# Patient Record
Sex: MALE | Race: WHITE | NOT HISPANIC OR LATINO | URBAN - METROPOLITAN AREA
[De-identification: names, ages, dates, MRNs, and addresses within clinical notes are randomized per-mention and may not be internally consistent; named-entity substitution may affect disease eponyms.]

---

## 2017-11-09 ENCOUNTER — INPATIENT (INPATIENT)
Facility: HOSPITAL | Age: 52
LOS: 0 days | Discharge: HOME | DRG: 419 | End: 2017-11-10
Attending: SPECIALIST | Admitting: SPECIALIST
Payer: COMMERCIAL

## 2017-11-09 ENCOUNTER — RESULT REVIEW (OUTPATIENT)
Age: 52
End: 2017-11-09

## 2017-11-09 ENCOUNTER — TRANSCRIPTION ENCOUNTER (OUTPATIENT)
Age: 52
End: 2017-11-09

## 2017-11-09 VITALS
RESPIRATION RATE: 18 BRPM | TEMPERATURE: 98 F | DIASTOLIC BLOOD PRESSURE: 81 MMHG | WEIGHT: 225.09 LBS | SYSTOLIC BLOOD PRESSURE: 162 MMHG | OXYGEN SATURATION: 97 % | HEART RATE: 80 BPM

## 2017-11-09 DIAGNOSIS — K81.9 CHOLECYSTITIS, UNSPECIFIED: ICD-10-CM

## 2017-11-09 DIAGNOSIS — Z98.890 OTHER SPECIFIED POSTPROCEDURAL STATES: Chronic | ICD-10-CM

## 2017-11-09 LAB
ABO RH CONFIRMATION: SIGNIFICANT CHANGE UP
ALBUMIN SERPL ELPH-MCNC: 3.8 G/DL — SIGNIFICANT CHANGE UP (ref 3.5–5)
ALP SERPL-CCNC: 58 U/L — SIGNIFICANT CHANGE UP (ref 40–120)
ALT FLD-CCNC: 50 U/L DA — SIGNIFICANT CHANGE UP (ref 10–60)
ANION GAP SERPL CALC-SCNC: 5 MMOL/L — SIGNIFICANT CHANGE UP (ref 5–17)
APPEARANCE UR: CLEAR — SIGNIFICANT CHANGE UP
APTT BLD: 34 SEC — SIGNIFICANT CHANGE UP (ref 27.5–37.4)
AST SERPL-CCNC: 20 U/L — SIGNIFICANT CHANGE UP (ref 10–40)
BASOPHILS # BLD AUTO: 0 K/UL — SIGNIFICANT CHANGE UP (ref 0–0.2)
BASOPHILS NFR BLD AUTO: 0.2 % — SIGNIFICANT CHANGE UP (ref 0–2)
BILIRUB SERPL-MCNC: 0.5 MG/DL — SIGNIFICANT CHANGE UP (ref 0.2–1.2)
BILIRUB UR-MCNC: NEGATIVE — SIGNIFICANT CHANGE UP
BUN SERPL-MCNC: 19 MG/DL — HIGH (ref 7–18)
CALCIUM SERPL-MCNC: 9 MG/DL — SIGNIFICANT CHANGE UP (ref 8.4–10.5)
CHLORIDE SERPL-SCNC: 100 MMOL/L — SIGNIFICANT CHANGE UP (ref 96–108)
CO2 SERPL-SCNC: 30 MMOL/L — SIGNIFICANT CHANGE UP (ref 22–31)
COLOR SPEC: YELLOW — SIGNIFICANT CHANGE UP
CREAT SERPL-MCNC: 1.13 MG/DL — SIGNIFICANT CHANGE UP (ref 0.5–1.3)
DIFF PNL FLD: ABNORMAL
EOSINOPHIL # BLD AUTO: 0 K/UL — SIGNIFICANT CHANGE UP (ref 0–0.5)
EOSINOPHIL NFR BLD AUTO: 0.3 % — SIGNIFICANT CHANGE UP (ref 0–6)
GLUCOSE SERPL-MCNC: 137 MG/DL — HIGH (ref 70–99)
GLUCOSE UR QL: NEGATIVE — SIGNIFICANT CHANGE UP
HCT VFR BLD CALC: 50.9 % — HIGH (ref 39–50)
HGB BLD-MCNC: 16.2 G/DL — SIGNIFICANT CHANGE UP (ref 13–17)
INR BLD: 1.02 RATIO — SIGNIFICANT CHANGE UP (ref 0.88–1.16)
KETONES UR-MCNC: NEGATIVE — SIGNIFICANT CHANGE UP
LEUKOCYTE ESTERASE UR-ACNC: NEGATIVE — SIGNIFICANT CHANGE UP
LIDOCAIN IGE QN: 166 U/L — SIGNIFICANT CHANGE UP (ref 73–393)
LYMPHOCYTES # BLD AUTO: 1.4 K/UL — SIGNIFICANT CHANGE UP (ref 1–3.3)
LYMPHOCYTES # BLD AUTO: 10.5 % — LOW (ref 13–44)
MCHC RBC-ENTMCNC: 26.4 PG — LOW (ref 27–34)
MCHC RBC-ENTMCNC: 31.9 GM/DL — LOW (ref 32–36)
MCV RBC AUTO: 82.6 FL — SIGNIFICANT CHANGE UP (ref 80–100)
MONOCYTES # BLD AUTO: 0.4 K/UL — SIGNIFICANT CHANGE UP (ref 0–0.9)
MONOCYTES NFR BLD AUTO: 2.8 % — SIGNIFICANT CHANGE UP (ref 2–14)
NEUTROPHILS # BLD AUTO: 11.6 K/UL — HIGH (ref 1.8–7.4)
NEUTROPHILS NFR BLD AUTO: 86.1 % — HIGH (ref 43–77)
NITRITE UR-MCNC: NEGATIVE — SIGNIFICANT CHANGE UP
PH UR: 8 — SIGNIFICANT CHANGE UP (ref 5–8)
PLATELET # BLD AUTO: 246 K/UL — SIGNIFICANT CHANGE UP (ref 150–400)
POTASSIUM SERPL-MCNC: 4.6 MMOL/L — SIGNIFICANT CHANGE UP (ref 3.5–5.3)
POTASSIUM SERPL-SCNC: 4.6 MMOL/L — SIGNIFICANT CHANGE UP (ref 3.5–5.3)
PROT SERPL-MCNC: 8 G/DL — SIGNIFICANT CHANGE UP (ref 6–8.3)
PROT UR-MCNC: 15
PROTHROM AB SERPL-ACNC: 11.1 SEC — SIGNIFICANT CHANGE UP (ref 9.8–12.7)
RBC # BLD: 6.16 M/UL — HIGH (ref 4.2–5.8)
RBC # FLD: 13.7 % — SIGNIFICANT CHANGE UP (ref 10.3–14.5)
SODIUM SERPL-SCNC: 135 MMOL/L — SIGNIFICANT CHANGE UP (ref 135–145)
SP GR SPEC: 1.01 — SIGNIFICANT CHANGE UP (ref 1.01–1.02)
UROBILINOGEN FLD QL: NEGATIVE — SIGNIFICANT CHANGE UP
WBC # BLD: 13.5 K/UL — HIGH (ref 3.8–10.5)
WBC # FLD AUTO: 13.5 K/UL — HIGH (ref 3.8–10.5)

## 2017-11-09 PROCEDURE — 47562 LAPAROSCOPIC CHOLECYSTECTOMY: CPT | Mod: AS

## 2017-11-09 PROCEDURE — 99285 EMERGENCY DEPT VISIT HI MDM: CPT | Mod: 25

## 2017-11-09 PROCEDURE — 88304 TISSUE EXAM BY PATHOLOGIST: CPT | Mod: 26

## 2017-11-09 PROCEDURE — 74177 CT ABD & PELVIS W/CONTRAST: CPT | Mod: 26

## 2017-11-09 PROCEDURE — 99053 MED SERV 10PM-8AM 24 HR FAC: CPT

## 2017-11-09 RX ORDER — PANTOPRAZOLE SODIUM 20 MG/1
40 TABLET, DELAYED RELEASE ORAL ONCE
Qty: 0 | Refills: 0 | Status: COMPLETED | OUTPATIENT
Start: 2017-11-09 | End: 2017-11-09

## 2017-11-09 RX ORDER — PIPERACILLIN AND TAZOBACTAM 4; .5 G/20ML; G/20ML
3.38 INJECTION, POWDER, LYOPHILIZED, FOR SOLUTION INTRAVENOUS ONCE
Qty: 0 | Refills: 0 | Status: COMPLETED | OUTPATIENT
Start: 2017-11-09 | End: 2017-11-09

## 2017-11-09 RX ORDER — HYDROMORPHONE HYDROCHLORIDE 2 MG/ML
0.5 INJECTION INTRAMUSCULAR; INTRAVENOUS; SUBCUTANEOUS
Qty: 0 | Refills: 0 | Status: DISCONTINUED | OUTPATIENT
Start: 2017-11-09 | End: 2017-11-09

## 2017-11-09 RX ORDER — ONDANSETRON 8 MG/1
4 TABLET, FILM COATED ORAL ONCE
Qty: 0 | Refills: 0 | Status: COMPLETED | OUTPATIENT
Start: 2017-11-09 | End: 2017-11-09

## 2017-11-09 RX ORDER — CEFOTETAN DISODIUM 1 G
1 VIAL (EA) INJECTION ONCE
Qty: 0 | Refills: 0 | Status: COMPLETED | OUTPATIENT
Start: 2017-11-09 | End: 2017-11-09

## 2017-11-09 RX ORDER — ENOXAPARIN SODIUM 100 MG/ML
40 INJECTION SUBCUTANEOUS EVERY 24 HOURS
Qty: 0 | Refills: 0 | Status: DISCONTINUED | OUTPATIENT
Start: 2017-11-09 | End: 2017-11-10

## 2017-11-09 RX ORDER — SODIUM CHLORIDE 9 MG/ML
1000 INJECTION, SOLUTION INTRAVENOUS
Qty: 0 | Refills: 0 | Status: DISCONTINUED | OUTPATIENT
Start: 2017-11-09 | End: 2017-11-09

## 2017-11-09 RX ORDER — INFLUENZA VIRUS VACCINE 15; 15; 15; 15 UG/.5ML; UG/.5ML; UG/.5ML; UG/.5ML
0.5 SUSPENSION INTRAMUSCULAR ONCE
Qty: 0 | Refills: 0 | Status: COMPLETED | OUTPATIENT
Start: 2017-11-09 | End: 2017-11-09

## 2017-11-09 RX ORDER — MORPHINE SULFATE 50 MG/1
4 CAPSULE, EXTENDED RELEASE ORAL ONCE
Qty: 0 | Refills: 0 | Status: DISCONTINUED | OUTPATIENT
Start: 2017-11-09 | End: 2017-11-09

## 2017-11-09 RX ORDER — LABETALOL HCL 100 MG
10 TABLET ORAL ONCE
Qty: 0 | Refills: 0 | Status: COMPLETED | OUTPATIENT
Start: 2017-11-09 | End: 2017-11-09

## 2017-11-09 RX ORDER — HYDROMORPHONE HYDROCHLORIDE 2 MG/ML
1 INJECTION INTRAMUSCULAR; INTRAVENOUS; SUBCUTANEOUS EVERY 4 HOURS
Qty: 0 | Refills: 0 | Status: DISCONTINUED | OUTPATIENT
Start: 2017-11-09 | End: 2017-11-10

## 2017-11-09 RX ORDER — KETOROLAC TROMETHAMINE 30 MG/ML
30 SYRINGE (ML) INJECTION ONCE
Qty: 0 | Refills: 0 | Status: DISCONTINUED | OUTPATIENT
Start: 2017-11-09 | End: 2017-11-09

## 2017-11-09 RX ORDER — FAMOTIDINE 10 MG/ML
20 INJECTION INTRAVENOUS ONCE
Qty: 0 | Refills: 0 | Status: COMPLETED | OUTPATIENT
Start: 2017-11-09 | End: 2017-11-09

## 2017-11-09 RX ORDER — ACETAMINOPHEN 500 MG
1000 TABLET ORAL ONCE
Qty: 0 | Refills: 0 | Status: COMPLETED | OUTPATIENT
Start: 2017-11-09 | End: 2017-11-09

## 2017-11-09 RX ORDER — ONDANSETRON 8 MG/1
4 TABLET, FILM COATED ORAL ONCE
Qty: 0 | Refills: 0 | Status: DISCONTINUED | OUTPATIENT
Start: 2017-11-09 | End: 2017-11-09

## 2017-11-09 RX ORDER — SODIUM CHLORIDE 9 MG/ML
3 INJECTION INTRAMUSCULAR; INTRAVENOUS; SUBCUTANEOUS ONCE
Qty: 0 | Refills: 0 | Status: COMPLETED | OUTPATIENT
Start: 2017-11-09 | End: 2017-11-09

## 2017-11-09 RX ADMIN — MORPHINE SULFATE 4 MILLIGRAM(S): 50 CAPSULE, EXTENDED RELEASE ORAL at 05:31

## 2017-11-09 RX ADMIN — FAMOTIDINE 20 MILLIGRAM(S): 10 INJECTION INTRAVENOUS at 04:43

## 2017-11-09 RX ADMIN — INFLUENZA VIRUS VACCINE 0.5 MILLILITER(S): 15; 15; 15; 15 SUSPENSION INTRAMUSCULAR at 22:33

## 2017-11-09 RX ADMIN — PANTOPRAZOLE SODIUM 40 MILLIGRAM(S): 20 TABLET, DELAYED RELEASE ORAL at 05:02

## 2017-11-09 RX ADMIN — Medication 30 MILLIGRAM(S): at 09:06

## 2017-11-09 RX ADMIN — HYDROMORPHONE HYDROCHLORIDE 1 MILLIGRAM(S): 2 INJECTION INTRAMUSCULAR; INTRAVENOUS; SUBCUTANEOUS at 22:11

## 2017-11-09 RX ADMIN — Medication 400 MILLIGRAM(S): at 09:05

## 2017-11-09 RX ADMIN — ONDANSETRON 4 MILLIGRAM(S): 8 TABLET, FILM COATED ORAL at 22:11

## 2017-11-09 RX ADMIN — Medication 10 MILLIGRAM(S): at 20:25

## 2017-11-09 RX ADMIN — HYDROMORPHONE HYDROCHLORIDE 1 MILLIGRAM(S): 2 INJECTION INTRAMUSCULAR; INTRAVENOUS; SUBCUTANEOUS at 23:24

## 2017-11-09 RX ADMIN — Medication 30 MILLIGRAM(S): at 10:19

## 2017-11-09 RX ADMIN — SODIUM CHLORIDE 3 MILLILITER(S): 9 INJECTION INTRAMUSCULAR; INTRAVENOUS; SUBCUTANEOUS at 04:33

## 2017-11-09 RX ADMIN — Medication 400 MILLIGRAM(S): at 18:13

## 2017-11-09 RX ADMIN — ONDANSETRON 4 MILLIGRAM(S): 8 TABLET, FILM COATED ORAL at 04:42

## 2017-11-09 RX ADMIN — Medication 1000 MILLIGRAM(S): at 18:45

## 2017-11-09 RX ADMIN — PIPERACILLIN AND TAZOBACTAM 100 GRAM(S): 4; .5 INJECTION, POWDER, LYOPHILIZED, FOR SOLUTION INTRAVENOUS at 07:51

## 2017-11-09 RX ADMIN — Medication 120 GRAM(S): at 11:42

## 2017-11-09 RX ADMIN — MORPHINE SULFATE 4 MILLIGRAM(S): 50 CAPSULE, EXTENDED RELEASE ORAL at 05:30

## 2017-11-09 RX ADMIN — Medication 1000 MILLIGRAM(S): at 10:19

## 2017-11-09 RX ADMIN — MORPHINE SULFATE 4 MILLIGRAM(S): 50 CAPSULE, EXTENDED RELEASE ORAL at 07:51

## 2017-11-09 RX ADMIN — MORPHINE SULFATE 4 MILLIGRAM(S): 50 CAPSULE, EXTENDED RELEASE ORAL at 10:19

## 2017-11-09 NOTE — H&P ADULT - GASTROINTESTINAL COMMENTS
Epigastric and RUQ abdominal pain diastasis rectii present. Small incisional scar of inferior umbilical area

## 2017-11-09 NOTE — ED PROVIDER NOTE - CHPI ED SYMPTOMS NEG
no dysuria/no constipation, no urinary frequency/no chills/no fever/no burning urination/no diarrhea/no hematuria

## 2017-11-09 NOTE — ED PROVIDER NOTE - MEDICAL DECISION MAKING DETAILS
53 y/o M pt presents with GERD versus gastritis versus cholecystitis. Will obtain labs, give GI cocktail, obtain CT Abd/Pel to evaluate for gallbladder pathology, and reassess.

## 2017-11-09 NOTE — H&P ADULT - HISTORY OF PRESENT ILLNESS
This is a 52-year-old male with a PMHx of GERD, NSAID-induced gastric ulcer, and cholelithiasis, PSH umbilical hernia repair with mesh who presented to the ED complaining of abdominal pain starting at 9 pm last night after eating spaghetti and meatballs. Patient localized the pain to the epigastric region and right upper quadrant deep to the ribs. Describes pain as constant and burning. Has had 10 associated episodes of nonbilious, nonbloody vomiting. Reports he had CT scan for similar, less severe episode 8 years ago that showed a small gallstone. Denies fever, chills, HA, chest pain, SOB, diarrhea, constipation, dysuria, and hematuria.

## 2017-11-09 NOTE — H&P ADULT - PMH
Cholelithiasis    Gastric ulcer due to nonsteroidal antiinflammatory drug (NSAID) therapy    GERD (gastroesophageal reflux disease)

## 2017-11-09 NOTE — ED PROVIDER NOTE - OBJECTIVE STATEMENT
53 y/o M pt with PMHx of GERD (on Prilosec) and significant PSHx of Hernia Repair presents to ED c/o epigastric pain and RUQ abdominal pain since x7 hours PTA in ED. Pt describes pan as constant and burning. Pt also reports associated vomiting; pt notes >10 episodes of non-bloody, non-bilious vomiting. As per pt, abdominal pain is similar to prior epigastric pain associated with GERD however current pain is worse and unrelieved with Prilosec. Pt denies fever, chills, constipation, diarrhea, dysuria, hematuria, burning with urination, urinary frequency, or any other complaints. Pt also denies Hx of smoking, EtOH abuse (pt is a social drinker), or drug use/abuse. NKDA.

## 2017-11-09 NOTE — BRIEF OPERATIVE NOTE - PROCEDURE
<<-----Click on this checkbox to enter Procedure Cholecystectomy, laparoscopic  11/09/2017    [facility]

## 2017-11-09 NOTE — PROGRESS NOTE ADULT - SUBJECTIVE AND OBJECTIVE BOX
POSTOP NOTE  Patient is doing okay.        Vital Signs Last 24 Hrs  T(C): 36.8 (2017 17:33), Max: 36.8 (2017 15:51)  T(F): 98.3 (2017 17:33), Max: 98.3 (2017 17:33)  HR: 82 (2017 20:25) (80 - 102)  BP: 171/102 (2017 20:25) (125/89 - 174/89)  BP(mean): 124 (2017 20:25) (102 - 124)  RR: 12 (2017 20:25) (12 - 20)  SpO2: 98% (2017 20:25) (97% - 100%)    Daily     Daily     I&O's Detail    2017 07:01  -  2017 20:45  --------------------------------------------------------  IN:    lactated ringers.: 335 mL  Total IN: 335 mL    OUT:  Total OUT: 0 mL    Total NET: 335 mL          MEDICATIONS  (STANDING):  enoxaparin Injectable 40 milliGRAM(s) SubCutaneous every 24 hours  lactated ringers. 1000 milliLiter(s) (135 mL/Hr) IV Continuous <Continuous>    MEDICATIONS  (PRN):  HYDROmorphone  Injectable 0.5 milliGRAM(s) IV Push every 10 minutes PRN Moderate Pain (4 - 6)  ondansetron Injectable 4 milliGRAM(s) IV Push once PRN Nausea and/or Vomiting      PHYSICAL EXAM:  GENERAL: In no acute distress  CHEST/LUNG: Clear to percussion bilaterally; Normal respiratory effort  HEART: Regular rate and rhythm  ABDOMEN: Soft, Nontender, Nondistended; Bowel sounds present  Dressings C/D/I  EXTREMITIES:  No clubbing, cyanosis, or edema    LABS:                        16.2   13.5  )-----------( 246      ( 2017 04:23 )             50.9     Neutrophil %:   11-09    135  |  100  |  19<H>  ----------------------------<  137<H>  4.6   |  30  |  1.13    Ca    9.0      2017 04:23    TPro  8.0  /  Alb  3.8  /  TBili  0.5  /  DBili  x   /  AST  20  /  ALT  50  /  AlkPhos  58  11-09    PT/INR - ( 2017 04:23 )   PT: 11.1 sec;   INR: 1.02 ratio         PTT - ( 2017 04:23 )  PTT:34.0 sec  Urinalysis Basic - ( 2017 08:12 )    Color: Yellow / Appearance: Clear / S.010 / pH: x  Gluc: x / Ketone: Negative  / Bili: Negative / Urobili: Negative   Blood: x / Protein: 15 / Nitrite: Negative   Leuk Esterase: Negative / RBC: 0-2 /HPF / WBC 0-2 /HPF   Sq Epi: x / Non Sq Epi: Occasional /HPF / Bacteria: Negative /HPF        RADIOLOGY & ADDITIONAL STUDIES:

## 2017-11-09 NOTE — ED PROVIDER NOTE - ABDOMINAL TENDER
moderate epigastric tenderness, mild RUQ abdominal tenderness right upper quadrant/moderate epigastric tenderness, mild RUQ abdominal tenderness

## 2017-11-09 NOTE — H&P ADULT - PROBLEM SELECTOR PLAN 1
- Admit to Surgery (Dr. Martin)  - NPO/IVF  - Plan for Lap aldo possible open today  - IV CefoTEtan   - IV Toradol and Tylenol  - Consent  - EKG prior to surgery  - Type and screen  - DVT PPx

## 2017-11-09 NOTE — ED PROVIDER NOTE - PROGRESS NOTE DETAILS
pt with acute cholecystitis. spoke to surgery MARQUIS Nixon who states that the team will evaluate patient after rounds

## 2017-11-10 ENCOUNTER — TRANSCRIPTION ENCOUNTER (OUTPATIENT)
Age: 52
End: 2017-11-10

## 2017-11-10 VITALS
SYSTOLIC BLOOD PRESSURE: 122 MMHG | TEMPERATURE: 98 F | HEART RATE: 95 BPM | DIASTOLIC BLOOD PRESSURE: 58 MMHG | OXYGEN SATURATION: 95 % | RESPIRATION RATE: 18 BRPM

## 2017-11-10 LAB
ALBUMIN SERPL ELPH-MCNC: 3.3 G/DL — LOW (ref 3.5–5)
ALP SERPL-CCNC: 51 U/L — SIGNIFICANT CHANGE UP (ref 40–120)
ALT FLD-CCNC: 53 U/L DA — SIGNIFICANT CHANGE UP (ref 10–60)
ANION GAP SERPL CALC-SCNC: 5 MMOL/L — SIGNIFICANT CHANGE UP (ref 5–17)
AST SERPL-CCNC: 42 U/L — HIGH (ref 10–40)
BASOPHILS # BLD AUTO: 0 K/UL — SIGNIFICANT CHANGE UP (ref 0–0.2)
BASOPHILS NFR BLD AUTO: 0.4 % — SIGNIFICANT CHANGE UP (ref 0–2)
BILIRUB DIRECT SERPL-MCNC: 0.1 MG/DL — SIGNIFICANT CHANGE UP (ref 0–0.2)
BILIRUB INDIRECT FLD-MCNC: 0.5 MG/DL — SIGNIFICANT CHANGE UP (ref 0.2–1)
BILIRUB SERPL-MCNC: 0.6 MG/DL — SIGNIFICANT CHANGE UP (ref 0.2–1.2)
BUN SERPL-MCNC: 12 MG/DL — SIGNIFICANT CHANGE UP (ref 7–18)
CALCIUM SERPL-MCNC: 8.1 MG/DL — LOW (ref 8.4–10.5)
CHLORIDE SERPL-SCNC: 105 MMOL/L — SIGNIFICANT CHANGE UP (ref 96–108)
CO2 SERPL-SCNC: 27 MMOL/L — SIGNIFICANT CHANGE UP (ref 22–31)
CREAT SERPL-MCNC: 0.89 MG/DL — SIGNIFICANT CHANGE UP (ref 0.5–1.3)
EOSINOPHIL # BLD AUTO: 0 K/UL — SIGNIFICANT CHANGE UP (ref 0–0.5)
EOSINOPHIL NFR BLD AUTO: 0 % — SIGNIFICANT CHANGE UP (ref 0–6)
GLUCOSE SERPL-MCNC: 130 MG/DL — HIGH (ref 70–99)
HCT VFR BLD CALC: 43.4 % — SIGNIFICANT CHANGE UP (ref 39–50)
HGB BLD-MCNC: 14 G/DL — SIGNIFICANT CHANGE UP (ref 13–17)
LYMPHOCYTES # BLD AUTO: 1.3 K/UL — SIGNIFICANT CHANGE UP (ref 1–3.3)
LYMPHOCYTES # BLD AUTO: 13.3 % — SIGNIFICANT CHANGE UP (ref 13–44)
MCHC RBC-ENTMCNC: 26.5 PG — LOW (ref 27–34)
MCHC RBC-ENTMCNC: 32.2 GM/DL — SIGNIFICANT CHANGE UP (ref 32–36)
MCV RBC AUTO: 82.2 FL — SIGNIFICANT CHANGE UP (ref 80–100)
MONOCYTES # BLD AUTO: 0.7 K/UL — SIGNIFICANT CHANGE UP (ref 0–0.9)
MONOCYTES NFR BLD AUTO: 6.7 % — SIGNIFICANT CHANGE UP (ref 2–14)
NEUTROPHILS # BLD AUTO: 7.9 K/UL — HIGH (ref 1.8–7.4)
NEUTROPHILS NFR BLD AUTO: 79.6 % — HIGH (ref 43–77)
PLATELET # BLD AUTO: 224 K/UL — SIGNIFICANT CHANGE UP (ref 150–400)
POTASSIUM SERPL-MCNC: 4.4 MMOL/L — SIGNIFICANT CHANGE UP (ref 3.5–5.3)
POTASSIUM SERPL-SCNC: 4.4 MMOL/L — SIGNIFICANT CHANGE UP (ref 3.5–5.3)
PROT SERPL-MCNC: 7.3 G/DL — SIGNIFICANT CHANGE UP (ref 6–8.3)
RBC # BLD: 5.28 M/UL — SIGNIFICANT CHANGE UP (ref 4.2–5.8)
RBC # FLD: 13.6 % — SIGNIFICANT CHANGE UP (ref 10.3–14.5)
SODIUM SERPL-SCNC: 137 MMOL/L — SIGNIFICANT CHANGE UP (ref 135–145)
WBC # BLD: 9.9 K/UL — SIGNIFICANT CHANGE UP (ref 3.8–10.5)
WBC # FLD AUTO: 9.9 K/UL — SIGNIFICANT CHANGE UP (ref 3.8–10.5)

## 2017-11-10 PROCEDURE — 85730 THROMBOPLASTIN TIME PARTIAL: CPT

## 2017-11-10 PROCEDURE — 81001 URINALYSIS AUTO W/SCOPE: CPT

## 2017-11-10 PROCEDURE — 96376 TX/PRO/DX INJ SAME DRUG ADON: CPT

## 2017-11-10 PROCEDURE — 86850 RBC ANTIBODY SCREEN: CPT

## 2017-11-10 PROCEDURE — 96374 THER/PROPH/DIAG INJ IV PUSH: CPT | Mod: XU

## 2017-11-10 PROCEDURE — 93005 ELECTROCARDIOGRAM TRACING: CPT

## 2017-11-10 PROCEDURE — 86901 BLOOD TYPING SEROLOGIC RH(D): CPT

## 2017-11-10 PROCEDURE — 90686 IIV4 VACC NO PRSV 0.5 ML IM: CPT

## 2017-11-10 PROCEDURE — 80053 COMPREHEN METABOLIC PANEL: CPT

## 2017-11-10 PROCEDURE — 85027 COMPLETE CBC AUTOMATED: CPT

## 2017-11-10 PROCEDURE — 96375 TX/PRO/DX INJ NEW DRUG ADDON: CPT

## 2017-11-10 PROCEDURE — 85610 PROTHROMBIN TIME: CPT

## 2017-11-10 PROCEDURE — 80076 HEPATIC FUNCTION PANEL: CPT

## 2017-11-10 PROCEDURE — 80048 BASIC METABOLIC PNL TOTAL CA: CPT

## 2017-11-10 PROCEDURE — 86900 BLOOD TYPING SEROLOGIC ABO: CPT

## 2017-11-10 PROCEDURE — 88304 TISSUE EXAM BY PATHOLOGIST: CPT

## 2017-11-10 PROCEDURE — 83690 ASSAY OF LIPASE: CPT

## 2017-11-10 PROCEDURE — 99285 EMERGENCY DEPT VISIT HI MDM: CPT | Mod: 25

## 2017-11-10 PROCEDURE — 74177 CT ABD & PELVIS W/CONTRAST: CPT

## 2017-11-10 RX ORDER — OMEPRAZOLE 10 MG/1
0 CAPSULE, DELAYED RELEASE ORAL
Qty: 0 | Refills: 0 | COMMUNITY

## 2017-11-10 RX ADMIN — HYDROMORPHONE HYDROCHLORIDE 1 MILLIGRAM(S): 2 INJECTION INTRAMUSCULAR; INTRAVENOUS; SUBCUTANEOUS at 05:16

## 2017-11-10 RX ADMIN — ENOXAPARIN SODIUM 40 MILLIGRAM(S): 100 INJECTION SUBCUTANEOUS at 05:14

## 2017-11-10 RX ADMIN — HYDROMORPHONE HYDROCHLORIDE 1 MILLIGRAM(S): 2 INJECTION INTRAMUSCULAR; INTRAVENOUS; SUBCUTANEOUS at 06:24

## 2017-11-10 NOTE — DISCHARGE NOTE ADULT - PLAN OF CARE
resume diet and nl activities f/u with Dr. Martin in 1 week, Ambulate as tolerated. Eat a healthy well balanced diet

## 2017-11-10 NOTE — DISCHARGE NOTE ADULT - HOSPITAL COURSE
This is a 52-year-old male with a PMHx of GERD, NSAID-induced gastric ulcer, and cholelithiasis, PSH umbilical hernia repair with mesh who presented to the ED complaining of abdominal pain starting at 9 pm last night after eating spaghetti and meatballs. Patient localized the pain to the epigastric region and right upper quadrant deep to the ribs. Describes pain as constant and burning. Has had 10 associated episodes of nonbilious, nonbloody vomiting. Reports he had CT scan for similar, less severe episode 8 years ago that showed a small gallstone. Denies fever, chills, HA, chest pain, SOB, diarrhea, constipation, dysuria, and hematuria. (09 Nov 2017 09:30)    Pt underwent laparoscopic cholecystectomy.  Intraoperative cholangiogram could not be performed as patient with very friable cystic duct.  Postoperative labs were nl aside from mildly elevated ALT. Pt tolerated regular diet and is stable for discharge home with pain medication.

## 2017-11-10 NOTE — PROGRESS NOTE ADULT - SUBJECTIVE AND OBJECTIVE BOX
pt s- new complaints. no cp no sob  vss a/f  Abd: soft nt nd no cvat  no calf swelling or erythema  wounds: clean no bleed. no erythema                          14.0   9.9   )-----------( 224      ( 10 Nov 2017 05:40 )             43.4

## 2017-11-10 NOTE — CHART NOTE - NSCHARTNOTEFT_GEN_A_CORE
Pt initially complained of chest tightness this AM    Pain likely secondary to CO2 insufflation from Cholecystectomy.     EKG and troponins ordered to r/o cardiac cause. Pt refused studies and states pain is resolved after ambulating.     Pt is stable for discharge home today.

## 2017-11-10 NOTE — DISCHARGE NOTE ADULT - MEDICATION SUMMARY - MEDICATIONS TO TAKE
I will START or STAY ON the medications listed below when I get home from the hospital:    oxyCODONE-acetaminophen 5 mg-325 mg oral tablet  -- 1 tab(s) by mouth every 6 hours, As Needed -for moderate pain MDD:4 tablets   -- Caution federal law prohibits the transfer of this drug to any person other  than the person for whom it was prescribed.  May cause drowsiness.  Alcohol may intensify this effect.  Use care when operating dangerous machinery.  This prescription cannot be refilled.  This product contains acetaminophen.  Do not use  with any other product containing acetaminophen to prevent possible liver damage.  Using more of this medication than prescribed may cause serious breathing problems.    -- Indication: For Cholecystitis    PriLOSEC  -- Indication: For -

## 2017-11-10 NOTE — DISCHARGE NOTE ADULT - PATIENT PORTAL LINK FT
“You can access the [employer] Patient Portal, offered by [facility], by registering with the following website: [email or web address]”

## 2017-11-10 NOTE — DISCHARGE NOTE ADULT - OTHER SIGNIFICANT FINDINGS
< from: CT Abdomen and Pelvis w/ Oral Cont and w/ IV Cont (11.09.17 @ 07:01) >    EXAM:  CT ABDOMEN AND PELVIS OC IC                            PROCEDURE DATE:  11/09/2017          INTERPRETATION:  11/9/2017 7:00 AM    CLINICAL INFORMATION: Right upper quadrant pain    COMPARISON: None    PROCEDURE:   CT of the Abdomen and Pelvis was performed with intravenous contrast.   Intravenous contrast: 90 ml Omnipaque 350. 10 ml discarded.  Oral contrast: positive contrast was administered.  Sagittal and coronal reformats were performed.    FINDINGS:    LOWER CHEST: Bibasilar atelectasis    LIVER: Within normal limits.  BILE DUCTS: Normal caliber.  GALLBLADDER: There is an 11 mm mildly hyperdense focus within the   gallbladder neck. There is trace haziness adjacent to the gallbladder and   a suggestion of gallbladder wall thickening.  SPLEEN: Within normal limits.  PANCREAS: Within normal limits.  ADRENALS: Within normal limits.  KIDNEYS/URETERS: Within normal limits.    BLADDER: Underdistended  REPRODUCTIVE ORGANS: Unremarkable    BOWEL: Oral contrast is seen within normal caliber small bowel loops. No   evidence of obstruction. Normal appendix. Scattered diverticula without   evidence of diverticulitis.  PERITONEUM: No ascites.  VESSELS:  Mild atherosclerosis  RETROPERITONEUM: No lymphadenopathy.    ABDOMINAL WALL: Small fat-containing umbilical hernia  BONES: Mild degenerative changes    IMPRESSION:     Mild gallbladder wall thickening with adjacent haziness as well as a   probable 11 mm stone along the gallbladder neck/cystic duct junction.   Findings likely indicate acute cholecystitis in the correct clinical   setting. Ultrasound and/or HIDA recommended.                    CISCO MANSFIELD M.D., ATTENDING RADIOLOGIST  This document has been electronically signed. Nov 9 2017  7:06AM                < end of copied text >

## 2017-11-10 NOTE — DISCHARGE NOTE ADULT - CARE PLAN
Principal Discharge DX:	Cholecystitis  Goal:	resume diet and nl activities  Instructions for follow-up, activity and diet:	f/u with Dr. Martin in 1 week, Ambulate as tolerated. Eat a healthy well balanced diet

## 2017-11-14 PROBLEM — K21.9 GASTRO-ESOPHAGEAL REFLUX DISEASE WITHOUT ESOPHAGITIS: Chronic | Status: ACTIVE | Noted: 2017-11-09

## 2017-11-14 PROBLEM — Z00.00 ENCOUNTER FOR PREVENTIVE HEALTH EXAMINATION: Status: ACTIVE | Noted: 2017-11-14

## 2017-11-14 LAB — SURGICAL PATHOLOGY FINAL REPORT - CH: SIGNIFICANT CHANGE UP

## 2017-11-20 PROBLEM — Z78.9 DOES NOT USE ILLICIT DRUGS: Status: ACTIVE | Noted: 2017-11-20

## 2017-11-20 PROBLEM — K46.9 HERNIA: Status: RESOLVED | Noted: 2017-11-20 | Resolved: 2017-11-20

## 2017-11-20 PROBLEM — Z87.19 HISTORY OF GASTRIC ULCER: Status: RESOLVED | Noted: 2017-11-20 | Resolved: 2017-11-20

## 2017-11-20 PROBLEM — Z87.19 HISTORY OF GASTROESOPHAGEAL REFLUX (GERD): Status: RESOLVED | Noted: 2017-11-20 | Resolved: 2017-11-20

## 2017-11-20 PROBLEM — Z87.19 HISTORY OF CHOLECYSTITIS: Status: RESOLVED | Noted: 2017-11-20 | Resolved: 2017-11-20

## 2017-11-21 ENCOUNTER — APPOINTMENT (OUTPATIENT)
Dept: SURGERY | Facility: CLINIC | Age: 52
End: 2017-11-21

## 2017-11-21 VITALS
HEIGHT: 71 IN | HEART RATE: 78 BPM | BODY MASS INDEX: 32.06 KG/M2 | SYSTOLIC BLOOD PRESSURE: 100 MMHG | WEIGHT: 229 LBS | TEMPERATURE: 98 F | OXYGEN SATURATION: 97 % | DIASTOLIC BLOOD PRESSURE: 66 MMHG

## 2017-11-21 DIAGNOSIS — K46.9 UNSPECIFIED ABDOMINAL HERNIA W/OUT OBSTRUCTION OR GANGRENE: ICD-10-CM

## 2017-11-21 DIAGNOSIS — Z78.9 OTHER SPECIFIED HEALTH STATUS: ICD-10-CM

## 2017-11-21 DIAGNOSIS — Z91.14 PATIENT'S OTHER NONCOMPLIANCE WITH MEDICATION REGIMEN: ICD-10-CM

## 2017-11-21 DIAGNOSIS — Z80.1 FAMILY HISTORY OF MALIGNANT NEOPLASM OF TRACHEA, BRONCHUS AND LUNG: ICD-10-CM

## 2017-11-21 DIAGNOSIS — Z87.19 PERSONAL HISTORY OF OTHER DISEASES OF THE DIGESTIVE SYSTEM: ICD-10-CM

## 2018-03-08 NOTE — PATIENT PROFILE ADULT. - MENTAL HEALTH CONDITIONS/SYMPTOMS, PROFILE
Physical Therapy  Eval Attempt    Patient Name:  Kaylee Ngo   MRN:  [id number]    Patient not seen today secondary to pt in a deep sleep during 1100 attempt and does not open eyes, only minimally moving head when asked to reposition head/neck as she was with significant R neck lean. Will follow-up as available.    Zeinab Zhang, PT   3/8/2018       none

## 2018-07-23 PROBLEM — Z78.9 ALCOHOL USE: Status: ACTIVE | Noted: 2017-11-20

## 2023-08-01 PROBLEM — K25.9 GASTRIC ULCER, UNSPECIFIED AS ACUTE OR CHRONIC, WITHOUT HEMORRHAGE OR PERFORATION: Chronic | Status: ACTIVE | Noted: 2017-11-09

## 2023-08-01 PROBLEM — K80.20 CALCULUS OF GALLBLADDER WITHOUT CHOLECYSTITIS WITHOUT OBSTRUCTION: Chronic | Status: ACTIVE | Noted: 2017-11-09

## 2023-08-11 ENCOUNTER — APPOINTMENT (OUTPATIENT)
Dept: ORTHOPEDIC SURGERY | Facility: CLINIC | Age: 58
End: 2023-08-11
Payer: OTHER MISCELLANEOUS

## 2023-08-11 VITALS — WEIGHT: 245 LBS | HEIGHT: 71 IN | BODY MASS INDEX: 34.3 KG/M2

## 2023-08-11 DIAGNOSIS — S13.9XXA SPRAIN OF JOINTS AND LIGAMENTS OF UNSPECIFIED PARTS OF NECK, INITIAL ENCOUNTER: ICD-10-CM

## 2023-08-11 DIAGNOSIS — S33.5XXA SPRAIN OF LIGAMENTS OF LUMBAR SPINE, INITIAL ENCOUNTER: ICD-10-CM

## 2023-08-11 DIAGNOSIS — S93.402A SPRAIN OF UNSPECIFIED LIGAMENT OF LEFT ANKLE, INITIAL ENCOUNTER: ICD-10-CM

## 2023-08-11 PROCEDURE — 72040 X-RAY EXAM NECK SPINE 2-3 VW: CPT

## 2023-08-11 PROCEDURE — 73610 X-RAY EXAM OF ANKLE: CPT | Mod: LT

## 2023-08-11 PROCEDURE — 72100 X-RAY EXAM L-S SPINE 2/3 VWS: CPT

## 2023-08-11 PROCEDURE — 99204 OFFICE O/P NEW MOD 45 MIN: CPT
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
## 2023-08-11 NOTE — WORK
[Sprain/Strain] : sprain/strain [Was the competent medical cause of the injury] : was the competent medical cause of the injury [Are not consistent with the injury] : are not consistent with the injury [Consistent with my objective findings] : consistent with my objective findings [Mild Partial] : mild partial [Does not reveal pre-existing condition(s) that may affect treatment/prognosis] : does not reveal pre-existing condition(s) that may affect treatment/prognosis [Can return to work with limitations on ______] : can return to work with limitations on [unfilled] [Climbing stairs/Ladders] : climbing stairs/ladders [Lifting] : lifting [Unknown at this time] : : unknown at this time [Patient] : patient [No] : No [No Rx restrictions] : No Rx restrictions. [I provided the services listed above] :  I provided the services listed above. [FreeTextEntry1] : good [Medium Work:] : Medium Work: Exerting 20 to 50 pounds of force occasionally, and/or 10 to 25 pounds of force frequently, and/or greater than negligible up to 10 pounds of force constantly to move objects. Physical demand requirements are in excess of those for Light Work

## 2023-08-11 NOTE — REVIEW OF SYSTEMS
[Joint Pain] : joint pain [Joint Stiffness] : joint stiffness [Headache] : headache [Dizziness] : dizziness [Sleep Disturbances] : ~T sleep disturbances [Negative] : Heme/Lymph

## 2023-08-11 NOTE — PHYSICAL EXAM
[5___] : plantar flexion 5[unfilled]/5 [Straightening consistent with spasm] : Straightening consistent with spasm [Disc space narrowing] : Disc space narrowing [Left] : left ankle [There are no fractures, subluxations or dislocations. No significant abnormalities are seen] : There are no fractures, subluxations or dislocations. No significant abnormalities are seen [FreeTextEntry1] : early anterior bridging osteophytes [] : negative anterior drawer at ankle [FreeTextEntry8] : tender supramalleolar region of tibia

## 2023-08-18 ENCOUNTER — APPOINTMENT (OUTPATIENT)
Dept: ORTHOPEDIC SURGERY | Facility: CLINIC | Age: 58
End: 2023-08-18

## 2023-08-18 ENCOUNTER — APPOINTMENT (OUTPATIENT)
Dept: ORTHOPEDIC SURGERY | Facility: CLINIC | Age: 58
End: 2023-08-18
Payer: OTHER MISCELLANEOUS

## 2023-08-18 VITALS — WEIGHT: 245 LBS | HEIGHT: 71 IN | BODY MASS INDEX: 34.3 KG/M2

## 2023-08-18 PROCEDURE — 99213 OFFICE O/P EST LOW 20 MIN: CPT
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
## 2023-08-18 NOTE — PHYSICAL EXAM
[Straightening consistent with spasm] : Straightening consistent with spasm [Disc space narrowing] : Disc space narrowing [FreeTextEntry1] : early anterior bridging osteophytes [5___] : plantar flexion 5[unfilled]/5 [] : negative anterior drawer at ankle [Left] : left ankle [There are no fractures, subluxations or dislocations. No significant abnormalities are seen] : There are no fractures, subluxations or dislocations. No significant abnormalities are seen [FreeTextEntry8] : tender supramalleolar region of tibia

## 2023-09-15 ENCOUNTER — APPOINTMENT (OUTPATIENT)
Dept: ORTHOPEDIC SURGERY | Facility: CLINIC | Age: 58
End: 2023-09-15
Payer: OTHER MISCELLANEOUS

## 2023-09-15 VITALS — WEIGHT: 245 LBS | HEIGHT: 71 IN | BODY MASS INDEX: 34.3 KG/M2

## 2023-09-15 DIAGNOSIS — M51.26 OTHER INTERVERTEBRAL DISC DISPLACEMENT, LUMBAR REGION: ICD-10-CM

## 2023-09-15 DIAGNOSIS — S93.402D SPRAIN OF UNSPECIFIED LIGAMENT OF LEFT ANKLE, SUBSEQUENT ENCOUNTER: ICD-10-CM

## 2023-09-15 DIAGNOSIS — S33.5XXD SPRAIN OF LIGAMENTS OF LUMBAR SPINE, SUBSEQUENT ENCOUNTER: ICD-10-CM

## 2023-09-15 DIAGNOSIS — S13.9XXD SPRAIN OF JOINTS AND LIGAMENTS OF UNSPECIFIED PARTS OF NECK, SUBSEQUENT ENCOUNTER: ICD-10-CM

## 2023-09-15 DIAGNOSIS — M51.36 OTHER INTERVERTEBRAL DISC DEGENERATION, LUMBAR REGION: ICD-10-CM

## 2023-09-15 DIAGNOSIS — M50.320 OTHER CERVICAL DISC DEGENERATION, MID-CERVICAL REGION, UNSPECIFIED LEVEL: ICD-10-CM

## 2023-09-15 PROCEDURE — 99213 OFFICE O/P EST LOW 20 MIN: CPT

## 2023-09-22 ENCOUNTER — APPOINTMENT (OUTPATIENT)
Dept: ORTHOPEDIC SURGERY | Facility: CLINIC | Age: 58
End: 2023-09-22

## 2023-10-13 ENCOUNTER — APPOINTMENT (OUTPATIENT)
Dept: ORTHOPEDIC SURGERY | Facility: CLINIC | Age: 58
End: 2023-10-13

## 2023-12-01 ENCOUNTER — APPOINTMENT (OUTPATIENT)
Dept: ORTHOPEDIC SURGERY | Facility: CLINIC | Age: 58
End: 2023-12-01

## 2023-12-15 ENCOUNTER — NON-APPOINTMENT (OUTPATIENT)
Age: 58
End: 2023-12-15

## 2023-12-28 ENCOUNTER — NON-APPOINTMENT (OUTPATIENT)
Age: 58
End: 2023-12-28

## 2024-01-01 ENCOUNTER — NON-APPOINTMENT (OUTPATIENT)
Age: 59
End: 2024-01-01

## 2024-09-18 ENCOUNTER — EMERGENCY (EMERGENCY)
Facility: HOSPITAL | Age: 59
LOS: 1 days | Discharge: HOME | End: 2024-09-18
Attending: STUDENT IN AN ORGANIZED HEALTH CARE EDUCATION/TRAINING PROGRAM
Payer: COMMERCIAL

## 2024-09-18 VITALS
WEIGHT: 251.99 LBS | TEMPERATURE: 98 F | OXYGEN SATURATION: 96 % | RESPIRATION RATE: 18 BRPM | SYSTOLIC BLOOD PRESSURE: 138 MMHG | HEART RATE: 82 BPM | DIASTOLIC BLOOD PRESSURE: 91 MMHG

## 2024-09-18 DIAGNOSIS — Z98.890 OTHER SPECIFIED POSTPROCEDURAL STATES: Chronic | ICD-10-CM

## 2024-09-18 LAB
ALBUMIN SERPL ELPH-MCNC: 3.5 G/DL — SIGNIFICANT CHANGE UP (ref 3.5–5)
ALP SERPL-CCNC: 71 U/L — SIGNIFICANT CHANGE UP (ref 40–120)
ALT FLD-CCNC: 44 U/L DA — SIGNIFICANT CHANGE UP (ref 10–60)
ANION GAP SERPL CALC-SCNC: 6 MMOL/L — SIGNIFICANT CHANGE UP (ref 5–17)
AST SERPL-CCNC: 29 U/L — SIGNIFICANT CHANGE UP (ref 10–40)
BASOPHILS # BLD AUTO: 0.03 K/UL — SIGNIFICANT CHANGE UP (ref 0–0.2)
BASOPHILS NFR BLD AUTO: 0.4 % — SIGNIFICANT CHANGE UP (ref 0–2)
BILIRUB SERPL-MCNC: 0.5 MG/DL — SIGNIFICANT CHANGE UP (ref 0.2–1.2)
BUN SERPL-MCNC: 8 MG/DL — SIGNIFICANT CHANGE UP (ref 7–18)
CALCIUM SERPL-MCNC: 8.9 MG/DL — SIGNIFICANT CHANGE UP (ref 8.4–10.5)
CHLORIDE SERPL-SCNC: 105 MMOL/L — SIGNIFICANT CHANGE UP (ref 96–108)
CO2 SERPL-SCNC: 27 MMOL/L — SIGNIFICANT CHANGE UP (ref 22–31)
CREAT SERPL-MCNC: 0.91 MG/DL — SIGNIFICANT CHANGE UP (ref 0.5–1.3)
EGFR: 96 ML/MIN/1.73M2 — SIGNIFICANT CHANGE UP
EGFR: 96 ML/MIN/1.73M2 — SIGNIFICANT CHANGE UP
EOSINOPHIL # BLD AUTO: 0.36 K/UL — SIGNIFICANT CHANGE UP (ref 0–0.5)
EOSINOPHIL NFR BLD AUTO: 4.4 % — SIGNIFICANT CHANGE UP (ref 0–6)
GLUCOSE SERPL-MCNC: 99 MG/DL — SIGNIFICANT CHANGE UP (ref 70–99)
HCT VFR BLD CALC: 48.4 % — SIGNIFICANT CHANGE UP (ref 39–50)
HGB BLD-MCNC: 15.7 G/DL — SIGNIFICANT CHANGE UP (ref 13–17)
IMM GRANULOCYTES NFR BLD AUTO: 0.2 % — SIGNIFICANT CHANGE UP (ref 0–0.9)
LACTATE SERPL-SCNC: 1.3 MMOL/L — SIGNIFICANT CHANGE UP (ref 0.7–2)
LIDOCAIN IGE QN: 23 U/L — SIGNIFICANT CHANGE UP (ref 13–75)
LYMPHOCYTES # BLD AUTO: 2.28 K/UL — SIGNIFICANT CHANGE UP (ref 1–3.3)
LYMPHOCYTES # BLD AUTO: 27.6 % — SIGNIFICANT CHANGE UP (ref 13–44)
MCHC RBC-ENTMCNC: 26.1 PG — LOW (ref 27–34)
MCHC RBC-ENTMCNC: 32.4 GM/DL — SIGNIFICANT CHANGE UP (ref 32–36)
MCV RBC AUTO: 80.5 FL — SIGNIFICANT CHANGE UP (ref 80–100)
MONOCYTES # BLD AUTO: 0.75 K/UL — SIGNIFICANT CHANGE UP (ref 0–0.9)
MONOCYTES NFR BLD AUTO: 9.1 % — SIGNIFICANT CHANGE UP (ref 2–14)
NEUTROPHILS # BLD AUTO: 4.82 K/UL — SIGNIFICANT CHANGE UP (ref 1.8–7.4)
NEUTROPHILS NFR BLD AUTO: 58.3 % — SIGNIFICANT CHANGE UP (ref 43–77)
NRBC # BLD: 0 /100 WBCS — SIGNIFICANT CHANGE UP (ref 0–0)
NRBC BLD-RTO: 0 /100 WBCS — SIGNIFICANT CHANGE UP (ref 0–0)
PLATELET # BLD AUTO: SIGNIFICANT CHANGE UP K/UL (ref 150–400)
POTASSIUM SERPL-MCNC: 4.3 MMOL/L — SIGNIFICANT CHANGE UP (ref 3.5–5.3)
POTASSIUM SERPL-SCNC: 4.3 MMOL/L — SIGNIFICANT CHANGE UP (ref 3.5–5.3)
PROT SERPL-MCNC: 7.3 G/DL — SIGNIFICANT CHANGE UP (ref 6–8.3)
RBC # BLD: 6.01 M/UL — HIGH (ref 4.2–5.8)
RBC # FLD: 13.5 % — SIGNIFICANT CHANGE UP (ref 10.3–14.5)
SODIUM SERPL-SCNC: 138 MMOL/L — SIGNIFICANT CHANGE UP (ref 135–145)
WBC # BLD: 8.26 K/UL — SIGNIFICANT CHANGE UP (ref 3.8–10.5)
WBC # FLD AUTO: 8.26 K/UL — SIGNIFICANT CHANGE UP (ref 3.8–10.5)

## 2024-09-18 PROCEDURE — 99285 EMERGENCY DEPT VISIT HI MDM: CPT

## 2024-09-18 PROCEDURE — 71045 X-RAY EXAM CHEST 1 VIEW: CPT | Mod: 26

## 2024-09-18 RX ORDER — ONDANSETRON HCL/PF 4 MG/2 ML
4 VIAL (ML) INJECTION ONCE
Refills: 0 | Status: COMPLETED | OUTPATIENT
Start: 2024-09-18 | End: 2024-09-19

## 2024-09-18 RX ORDER — MAGNESIUM, ALUMINUM HYDROXIDE 200-200 MG
30 TABLET,CHEWABLE ORAL ONCE
Refills: 0 | Status: COMPLETED | OUTPATIENT
Start: 2024-09-18 | End: 2024-09-18

## 2024-09-19 PROCEDURE — 83605 ASSAY OF LACTIC ACID: CPT

## 2024-09-19 PROCEDURE — 99284 EMERGENCY DEPT VISIT MOD MDM: CPT | Mod: 25

## 2024-09-19 PROCEDURE — 71045 X-RAY EXAM CHEST 1 VIEW: CPT

## 2024-09-19 PROCEDURE — 36415 COLL VENOUS BLD VENIPUNCTURE: CPT

## 2024-09-19 PROCEDURE — 96375 TX/PRO/DX INJ NEW DRUG ADDON: CPT

## 2024-09-19 PROCEDURE — 96374 THER/PROPH/DIAG INJ IV PUSH: CPT

## 2024-09-19 PROCEDURE — 85025 COMPLETE CBC W/AUTO DIFF WBC: CPT

## 2024-09-19 PROCEDURE — 80053 COMPREHEN METABOLIC PANEL: CPT

## 2024-09-19 PROCEDURE — 83690 ASSAY OF LIPASE: CPT

## 2024-09-19 RX ORDER — CYCLOBENZAPRINE HYDROCHLORIDE 15 MG/1
1 CAPSULE, EXTENDED RELEASE ORAL
Qty: 14 | Refills: 0
Start: 2024-09-19 | End: 2024-09-25

## 2024-09-19 RX ORDER — ONDANSETRON HCL/PF 4 MG/2 ML
1 VIAL (ML) INJECTION
Qty: 14 | Refills: 0
Start: 2024-09-19 | End: 2024-09-25

## 2024-09-19 RX ADMIN — Medication 30 MILLILITER(S): at 00:01

## 2024-09-19 RX ADMIN — Medication 1000 MILLILITER(S): at 00:03

## 2024-09-19 RX ADMIN — Medication 20 MILLIGRAM(S): at 00:02

## 2024-09-19 RX ADMIN — Medication 4 MILLIGRAM(S): at 00:02
